# Patient Record
Sex: FEMALE | Race: WHITE | NOT HISPANIC OR LATINO | Employment: FULL TIME | URBAN - METROPOLITAN AREA
[De-identification: names, ages, dates, MRNs, and addresses within clinical notes are randomized per-mention and may not be internally consistent; named-entity substitution may affect disease eponyms.]

---

## 2017-04-03 ENCOUNTER — NON-PROVIDER VISIT (OUTPATIENT)
Dept: URGENT CARE | Facility: CLINIC | Age: 27
End: 2017-04-03

## 2017-04-03 DIAGNOSIS — Z04.89 EXAMINATION FOR MEDICOLEGAL REASON: ICD-10-CM

## 2017-04-03 LAB
AMP AMPHETAMINE: NORMAL
COC COCAINE: NORMAL
INT CON NEG: NORMAL
INT CON POS: NORMAL
OPI OPIATES: NORMAL
PCP PHENCYCLIDINE: NORMAL
POC DRUG COMMENT 753798-OCCUPATIONAL HEALTH: NORMAL
THC: NORMAL

## 2017-04-03 PROCEDURE — 80305 DRUG TEST PRSMV DIR OPT OBS: CPT | Performed by: FAMILY MEDICINE

## 2018-01-14 ENCOUNTER — OFFICE VISIT (OUTPATIENT)
Dept: URGENT CARE | Facility: PHYSICIAN GROUP | Age: 28
End: 2018-01-14
Payer: COMMERCIAL

## 2018-01-14 VITALS
HEART RATE: 102 BPM | BODY MASS INDEX: 22.37 KG/M2 | WEIGHT: 147.6 LBS | TEMPERATURE: 98.9 F | SYSTOLIC BLOOD PRESSURE: 100 MMHG | OXYGEN SATURATION: 96 % | HEIGHT: 68 IN | DIASTOLIC BLOOD PRESSURE: 60 MMHG

## 2018-01-14 DIAGNOSIS — J06.9 VIRAL URI WITH COUGH: ICD-10-CM

## 2018-01-14 DIAGNOSIS — Z87.09 HISTORY OF ASTHMA: ICD-10-CM

## 2018-01-14 PROCEDURE — 99214 OFFICE O/P EST MOD 30 MIN: CPT | Performed by: NURSE PRACTITIONER

## 2018-01-14 RX ORDER — PREDNISONE 20 MG/1
TABLET ORAL
Qty: 10 TAB | Refills: 0 | Status: SHIPPED | OUTPATIENT
Start: 2018-01-14 | End: 2021-02-19

## 2018-01-14 RX ORDER — CODEINE PHOSPHATE AND GUAIFENESIN 10; 100 MG/5ML; MG/5ML
5 SOLUTION ORAL EVERY 4 HOURS PRN
Qty: 250 ML | Refills: 0 | Status: SHIPPED | OUTPATIENT
Start: 2018-01-14 | End: 2018-01-21

## 2018-01-14 ASSESSMENT — ENCOUNTER SYMPTOMS
SHORTNESS OF BREATH: 0
SORE THROAT: 0
FEVER: 0
COUGH: 1
WHEEZING: 0

## 2018-01-14 NOTE — PROGRESS NOTES
"Subjective:      Dee Vance is a 27 y.o. female who presents with Cough (chest congestion, x3 days purple lips, x1 month )            Cough   This is a new problem. Episode onset: Pt reports onset of cough 4-5 days ago. Admits she is coughing all night long, difficult to sleep. She has a hx of asthma. The problem has been gradually worsening. The cough is non-productive. Pertinent negatives include no fever, nasal congestion, sore throat, shortness of breath or wheezing. Associated symptoms comments: Pt also reports her lips have been blue in several for the past month. She is unsure what caused this. The area of discoloration has not changed and it does not migrate to other areas of her lips. Denies any daily medication or known allergies. She has tried a beta-agonist inhaler and rest for the symptoms. The treatment provided mild relief. Her past medical history is significant for asthma. There is no history of bronchitis.       Review of Systems   Constitutional: Negative for fever.   HENT: Negative for sore throat.         Purple discoloration to lips   Respiratory: Positive for cough. Negative for shortness of breath and wheezing.    All other systems reviewed and are negative.    Past Medical History:   Diagnosis Date   • ASTHMA       Past Surgical History:   Procedure Laterality Date   • OTHER      tonsillectomy      Social History     Social History   • Marital status: Single     Spouse name: N/A   • Number of children: N/A   • Years of education: N/A     Occupational History   • Not on file.     Social History Main Topics   • Smoking status: Never Smoker   • Smokeless tobacco: Never Used   • Alcohol use No   • Drug use: No   • Sexual activity: Not on file     Other Topics Concern   • Not on file     Social History Narrative   • No narrative on file          Objective:     /60   Pulse (!) 102   Temp 37.2 °C (98.9 °F)   Ht 1.727 m (5' 8\")   Wt 67 kg (147 lb 9.6 oz)   SpO2 96%   " Breastfeeding? No   BMI 22.44 kg/m²      Physical Exam   Constitutional: She is oriented to person, place, and time. Vital signs are normal. She appears well-developed and well-nourished.   HENT:   Head: Normocephalic and atraumatic.   Right Ear: Tympanic membrane and external ear normal.   Left Ear: Tympanic membrane and external ear normal.   Nose: Nose normal.   Mouth/Throat:       Eyes: EOM are normal. Pupils are equal, round, and reactive to light.   Cardiovascular: Normal rate and regular rhythm.    Pulmonary/Chest: Effort normal. She has wheezes in the right upper field and the left upper field. She has rhonchi in the right upper field and the left upper field.   Musculoskeletal: Normal range of motion.   Neurological: She is alert and oriented to person, place, and time.   Skin: Skin is warm and dry. Capillary refill takes less than 2 seconds.   Psychiatric: She has a normal mood and affect. Her speech is normal and behavior is normal. Thought content normal.   Vitals reviewed.              Assessment/Plan:     1. Viral URI with cough  - guaifenesin-codeine (ROBITUSSIN AC) Solution oral solution; Take 5 mL by mouth every four hours as needed for Cough for up to 7 days.  Dispense: 250 mL; Refill: 0    2. History of asthma  - predniSONE (DELTASONE) 20 MG Tab; Take 2 tabs PO daily for five days  Dispense: 10 Tab; Refill: 0  - REFERRAL TO FAMILY PRACTICE    Pt admits she does have a nebulizer at home, she can use this 2-3 times per day as needed for the cough and wheeze  Steroid burst if needed  Sedating effects of cough syrup discussed. Checked patient's  and find no evidence of narcotic misuse.  Advised her she does not require antibiotics at this time  OTC cough syrup during the day  Supportive care, differential diagnoses, and indications for immediate follow-up discussed with patient.    Pathogenesis of diagnosis discussed including typical length and natural progression.      Instructed to return to   or nearest emergency department if symptoms fail to improve, for any change in condition, further concerns, or new concerning symptoms.  Patient states understanding of the plan of care and discharge instructions.

## 2018-04-23 ENCOUNTER — HOSPITAL ENCOUNTER (EMERGENCY)
Facility: MEDICAL CENTER | Age: 28
End: 2018-04-23
Attending: EMERGENCY MEDICINE
Payer: MEDICAID

## 2018-04-23 VITALS
RESPIRATION RATE: 16 BRPM | WEIGHT: 144.4 LBS | TEMPERATURE: 98 F | OXYGEN SATURATION: 96 % | SYSTOLIC BLOOD PRESSURE: 118 MMHG | BODY MASS INDEX: 21.89 KG/M2 | HEIGHT: 68 IN | DIASTOLIC BLOOD PRESSURE: 60 MMHG | HEART RATE: 98 BPM

## 2018-04-23 DIAGNOSIS — L81.9 DISORDER OF PIGMENTATION OF LIP: ICD-10-CM

## 2018-04-23 PROCEDURE — 99283 EMERGENCY DEPT VISIT LOW MDM: CPT

## 2018-04-23 ASSESSMENT — PAIN SCALES - GENERAL: PAINLEVEL_OUTOF10: 6

## 2018-04-23 NOTE — ED TRIAGE NOTES
Patient to ED with complaints of lip discoloration and tingling. States she noticed lips turned purple a few months ago but now discoloration is worse and tingling is constant and uncomfortable. No know food allergies. Has not started any new medications. No facial swelling. No wheezing or tongue swelling. Denies SOB.     Pt educated on ED process and asked to wait in lobby. Patient educated on importance of alerting staff to new or worsening symptoms or concerns.

## 2018-04-24 NOTE — DISCHARGE INSTRUCTIONS
Be sure to follow-up with dermatologist, return to emergency department with any swelling of your lips, any fever or any other complaints

## 2018-04-24 NOTE — ED PROVIDER NOTES
"ED Provider Note    CHIEF COMPLAINT  Chief Complaint   Patient presents with   • Lip Swelling     skin discoloration, tingling  months on and off worse today       HPI  Dee Vance is a 27 y.o. female who presents for evaluation of tingling of her lips, present for the past couple days but intermittently present for about 6 months. She reports over this timeframe she also has intermittent patchy discoloration of her lips, no swelling, no difficulty swallowing, no numbness, no other complaints    REVIEW OF SYSTEMS  Negative for fever, weakness, numbness.    PAST MEDICAL HISTORY   has a past medical history of ASTHMA.    SOCIAL HISTORY  Social History     Social History Main Topics   • Smoking status: Never Smoker   • Smokeless tobacco: Never Used   • Alcohol use No   • Drug use: No   • Sexual activity: Not on file       SURGICAL HISTORY   has a past surgical history that includes other.    CURRENT MEDICATIONS  I personally reviewed the medication list in the charting documentation.     ALLERGIES  No Known Allergies    PHYSICAL EXAM  VITAL SIGNS: /69   Pulse (!) 105   Temp 36.7 °C (98 °F)   Resp 17   Ht 1.727 m (5' 8\")   Wt 65.5 kg (144 lb 6.4 oz)   SpO2 96%   BMI 21.96 kg/m²   Constitutional: Alert in no apparent distress.  HENT: No lymphedema, subtle patchy bluish discoloration of the lips, no vesicles, no other lesions  Eyes: Conjunctiva normal, Non-icteric.   Chest: Normal nonlabored respirations  Skin: No erythema, No rash.   Musculoskeletal: Good range of motion in all major joints.   Neurologic: Alert, No focal deficits noted.   Psychiatric: Affect normal, Judgment normal.    COURSE & MEDICAL DECISION MAKING  Pertinent Labs & Imaging studies reviewed. (See chart for details)    Encounter Summary: This is a 27 y.o. female with discoloration and tingling of her lips, intermittently present for 6 months or so, no wounds, no vesicles, no other abnormalities on exam. At this point I do not think " there is any emergent or urgent etiologies, she'll follow up with dermatologist, return instructions discussed      DISPOSITION: Discharge Home      FINAL IMPRESSION  1. Disorder of pigmentation of lip        This dictation was created using voice recognition software. The accuracy of the dictation is limited to the abilities of the software. I expect there may be some errors of grammar and possibly content. The nursing notes were reviewed and certain aspects of this information were incorporated into this note.    Electronically signed by: Ferny Cain, 4/23/2018 6:40 PM

## 2018-04-24 NOTE — ED NOTES
.Patient d/c per MD order.  Pt states understanding of d.c instructions.  Pt given copies of instructions.  Belongings with pt.  VSS.  Pt ambulate to lobby with steady gait.

## 2019-07-12 ENCOUNTER — TELEPHONE (OUTPATIENT)
Dept: SCHEDULING | Facility: IMAGING CENTER | Age: 29
End: 2019-07-12

## 2020-07-29 ENCOUNTER — HOSPITAL ENCOUNTER (EMERGENCY)
Facility: MEDICAL CENTER | Age: 30
End: 2020-07-29
Attending: EMERGENCY MEDICINE | Admitting: EMERGENCY MEDICINE

## 2020-07-29 VITALS
DIASTOLIC BLOOD PRESSURE: 74 MMHG | OXYGEN SATURATION: 100 % | HEART RATE: 85 BPM | SYSTOLIC BLOOD PRESSURE: 110 MMHG | BODY MASS INDEX: 22.52 KG/M2 | WEIGHT: 148.59 LBS | TEMPERATURE: 97 F | HEIGHT: 68 IN | RESPIRATION RATE: 16 BRPM

## 2020-07-29 DIAGNOSIS — S05.02XA ABRASION OF LEFT CORNEA, INITIAL ENCOUNTER: ICD-10-CM

## 2020-07-29 PROCEDURE — 99283 EMERGENCY DEPT VISIT LOW MDM: CPT

## 2020-07-29 PROCEDURE — 700101 HCHG RX REV CODE 250: Performed by: EMERGENCY MEDICINE

## 2020-07-29 RX ORDER — PROPARACAINE HYDROCHLORIDE 5 MG/ML
1 SOLUTION/ DROPS OPHTHALMIC ONCE
Status: DISCONTINUED | OUTPATIENT
Start: 2020-07-29 | End: 2020-07-29

## 2020-07-29 RX ORDER — PROPARACAINE HYDROCHLORIDE 5 MG/ML
1 SOLUTION/ DROPS OPHTHALMIC ONCE
Status: COMPLETED | OUTPATIENT
Start: 2020-07-29 | End: 2020-07-29

## 2020-07-29 RX ORDER — CIPROFLOXACIN HYDROCHLORIDE 3.5 MG/ML
1 SOLUTION/ DROPS TOPICAL ONCE
Status: COMPLETED | OUTPATIENT
Start: 2020-07-29 | End: 2020-07-29

## 2020-07-29 RX ORDER — CIPROFLOXACIN HYDROCHLORIDE 3.5 MG/ML
1 SOLUTION/ DROPS TOPICAL EVERY 4 HOURS
Qty: 2 ML | Refills: 0 | Status: SHIPPED | OUTPATIENT
Start: 2020-07-29 | End: 2020-08-03

## 2020-07-29 RX ADMIN — PROPARACAINE HYDROCHLORIDE 1 DROP: 5 SOLUTION/ DROPS OPHTHALMIC at 17:00

## 2020-07-29 RX ADMIN — FLUORESCEIN SODIUM 1 MG: 1 STRIP OPHTHALMIC at 17:00

## 2020-07-29 RX ADMIN — CIPROFLOXACIN 1 DROP: 3 SOLUTION OPHTHALMIC at 17:54

## 2020-07-29 ASSESSMENT — ENCOUNTER SYMPTOMS
EYE PAIN: 1
FEVER: 0
EYE REDNESS: 1

## 2020-07-29 NOTE — ED PROVIDER NOTES
ED Provider Note    Scribed for Ratna Larkin M.D. by Bogdan Marks. 7/29/2020, 4:56 PM.    Primary care provider: Pcp Pt States None  Means of arrival: Walk in  History obtained from: Patient  History limited by: None    CHIEF COMPLAINT  Chief Complaint   Patient presents with   • Eye Pain     pt woke with left eye pain, redness, and foreign body feeling. pt als noted to have some redness in the periorbital area.        HPI  Dee Vance is a 29 y.o. female who presents to the Emergency Department complaining of left eye pain onset this morning. Patient states she woke up this morning and put on her contacts. States her eye then started watering, causing the contact to fall out. She removed the contact but states her left eye has been painful, itchy, and red since then. She thought that she sustained an eyelash in the eye, so she put on some eyedrops to attempt to flush out the eye but states it did not help. She continues to have the eye pain and redness.  Pain is not worsened with extraocular movements.  Patient does not believe her workplace is a source of a potential foreign body, as she works in an office setting. She does wear prescription corrective glasses when she is not wearing her contacts.  Patient reports that she occasionally does leave her contacts on at night and forgets to take them out.  No reports of any recent fevers.    PPE Note: I personally donned PPE for all patient encounters during this visit, including with a surgical mask.     Scribe remained outside the patient's room and did not have any contact with the patient for the duration of patient encounter.       REVIEW OF SYSTEMS  Review of Systems   Constitutional: Negative for fever.   Eyes: Positive for pain (left) and redness (left).        Positive left eye watering and itchiness     PAST MEDICAL HISTORY   has a past medical history of ASTHMA.    SURGICAL HISTORY   has a past surgical history that includes  "other.    SOCIAL HISTORY  Social History     Tobacco Use   • Smoking status: Never Smoker   • Smokeless tobacco: Never Used   Substance Use Topics   • Alcohol use: No   • Drug use: No      Social History     Substance and Sexual Activity   Drug Use No       FAMILY HISTORY  No pertinent family history reported.     CURRENT MEDICATIONS  Home Medications     Reviewed by Santa Ash R.N. (Registered Nurse) on 07/29/20 at 1526  Med List Status: Complete   Medication Last Dose Status   albuterol (VENTOLIN OR PROVENTIL) 108 (90 BASE) MCG/ACT AERS inhalation aerosol  Active   albuterol (VENTOLIN OR PROVENTIL) 108 (90 BASE) MCG/ACT AERS inhalation aerosol  Active   azithromycin (ZITHROMAX) 250 MG TABS Not Taking Active   hydrocodone-acetaminophen (NORCO) 5-325 MG TABS per tablet Not Taking Active   predniSONE (DELTASONE) 20 MG Tab Not Taking Active                ALLERGIES  No Known Allergies    PHYSICAL EXAM  VITAL SIGNS: BP (!) 98/60   Pulse (!) 108   Temp 36.1 °C (97 °F) (Temporal)   Resp 16   Ht 1.727 m (5' 8\")   Wt 67.4 kg (148 lb 9.4 oz)   LMP 06/30/2020   SpO2 98%   BMI 22.59 kg/m²   Vitals reviewed by myself.  Physical Exam  Nursing note and vitals reviewed.  Constitutional: Well-developed and well-nourished. No acute distress.   HENT: Head is normocephalic and atraumatic.  Eyes: extra-ocular movements intact without pain, pupils are equal and reactive to light bilaterally.  No periorbital edema or erythema.. Eye pressures 10 bilaterally.  Visual acuity is 20/70 in the left eye, 20/50 in the right eye, 20/50 in both eyes without glasses as she forgot to bring them.  Corneal abrasion to left eye , no retained foreign body, no corneal ulcer, negative Racquel sign  Cardiovascular: Tachycardic and regular rhythm. No murmur heard.  Pulmonary/Chest: Effort normal  Musculoskeletal: Extremities exhibit normal range of motion without edema or tenderness.   Neurological: Awake and alert  Skin: Skin is warm and dry. " No rash.        REASSESSMENT  4:56 PM Patient seen and examined at bedside. Explained to patient that her physical exam shows corneal abrasions. There was no retained foreign body or corneal ulcer. The patient will be discharged with instructions regarding supportive care and medications. Prescription for antibiotic eyedrop was provided. Instructions were given for follow-up with her eye doctor. Discussed indications for seeking immediate medical attention. Patient was given the opportunity for questions. The patient understands and agrees. I advised patient to avoid wearing her contacts until her symptoms have fully resolved.     COURSE & MEDICAL DECISION MAKING  Nursing notes, VS, PMSFHx reviewed in chart.    Patient is a 29-year-old female who comes in for evaluation of left eye pain.  Differential diagnosis includes corneal abrasion, corneal ulcer, dry eyes.  On physical exam patient is well-appearing with vitals notable for slight tachycardia likely secondary to her discomfort.  Tachycardia resolved with treatment of eye pain with proparacaine.  Patient's eye pressures are within normal limits making acute glaucoma unlikely.  Exam is consistent with corneal abrasion likely from when she was attempting to put her contact in her eye.  She has no evidence of retained foreign body.  I advised patient that we will start her on ciprofloxacin ophthalmologic drops and have advised her to not wear her contacts for a couple of days until this is healed.  She does have corrective glasses that she can wear in the meantime.  I also advised her to follow-up with ophthalmology and gave her strict return precautions.  Patient is then discharged in stable condition.      FINAL IMPRESSION  1. Abrasion of left cornea, initial encounter          Bogdan ZABALA (Erik), vaibhav scribing for, and in the presence of, Ratna Larkin M.D..    Electronically signed by: Bogdan Marks (Erik), 7/29/2020    Ratna ZABALA,  M.D. personally performed the services described in this documentation, as scribed by Bogdan Marks in my presence, and it is both accurate and complete. E    The note accurately reflects work and decisions made by me.  Ratna Larkin M.D.  7/29/2020  7:49 PM

## 2020-07-29 NOTE — ED TRIAGE NOTES
"Dee Vance   29 y.o. female   Chief Complaint   Patient presents with   • Eye Pain     pt woke with left eye pain, redness, and foreign body feeling. pt als noted to have some redness in the periorbital area.       Pt amb to triage with steady gait for above complaint.  Pt is alert and oriented, speaking in full sentences, follows commands and responds appropriately to questions. Pt appears to be in some discomfort. Resp are even and unlabored.   Pt placed in lobby. Pt educated on triage process. Pt encouraged to alert staff for any changes.    BP (!) 98/60   Pulse (!) 108   Temp 36.1 °C (97 °F) (Temporal)   Resp 16   Ht 1.727 m (5' 8\")   Wt 67.4 kg (148 lb 9.4 oz)   LMP 06/30/2020   SpO2 98%   BMI 22.59 kg/m²     "

## 2020-07-30 NOTE — ED NOTES
Patient given discharge instructions, follow up information, and prescription x 1, verbalized understanding, requesting work note, ERP notified.

## 2021-01-11 ENCOUNTER — HOSPITAL ENCOUNTER (EMERGENCY)
Facility: MEDICAL CENTER | Age: 31
End: 2021-01-11
Attending: EMERGENCY MEDICINE
Payer: OTHER MISCELLANEOUS

## 2021-01-11 VITALS
BODY MASS INDEX: 23.32 KG/M2 | WEIGHT: 153.88 LBS | OXYGEN SATURATION: 99 % | HEART RATE: 89 BPM | TEMPERATURE: 97.9 F | RESPIRATION RATE: 15 BRPM | SYSTOLIC BLOOD PRESSURE: 122 MMHG | DIASTOLIC BLOOD PRESSURE: 82 MMHG | HEIGHT: 68 IN

## 2021-01-11 DIAGNOSIS — S43.402A SPRAIN OF LEFT SHOULDER, UNSPECIFIED SHOULDER SPRAIN TYPE, INITIAL ENCOUNTER: ICD-10-CM

## 2021-01-11 DIAGNOSIS — S16.1XXA STRAIN OF NECK MUSCLE, INITIAL ENCOUNTER: ICD-10-CM
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
## 2021-01-11 PROCEDURE — 99284 EMERGENCY DEPT VISIT MOD MDM: CPT

## 2021-01-11 PROCEDURE — 700102 HCHG RX REV CODE 250 W/ 637 OVERRIDE(OP): Performed by: EMERGENCY MEDICINE

## 2021-01-11 PROCEDURE — A9270 NON-COVERED ITEM OR SERVICE: HCPCS | Performed by: EMERGENCY MEDICINE

## 2021-01-11 RX ORDER — IBUPROFEN 600 MG/1
600 TABLET ORAL EVERY 6 HOURS PRN
Qty: 20 TAB | Refills: 0 | Status: SHIPPED | OUTPATIENT
Start: 2021-01-11 | End: 2021-02-19

## 2021-01-11 RX ORDER — METHOCARBAMOL 500 MG/1
500 TABLET, FILM COATED ORAL EVERY 6 HOURS PRN
Qty: 20 TAB | Refills: 0 | Status: SHIPPED | OUTPATIENT
Start: 2021-01-11 | End: 2021-02-19

## 2021-01-11 RX ORDER — IBUPROFEN 600 MG/1
600 TABLET ORAL ONCE
Status: COMPLETED | OUTPATIENT
Start: 2021-01-11 | End: 2021-01-11

## 2021-01-11 RX ADMIN — IBUPROFEN 600 MG: 600 TABLET ORAL at 13:43

## 2021-01-11 ASSESSMENT — LIFESTYLE VARIABLES
DO YOU DRINK ALCOHOL: NO
HAVE YOU EVER FELT YOU SHOULD CUT DOWN ON YOUR DRINKING: NO
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
## 2021-01-11 NOTE — DISCHARGE INSTRUCTIONS
Follow-up with primary care 1 to 2 days for reevaluation, to establish care, for medication management, close blood pressure monitoring and referral to orthopedics if symptoms persist.    Motrin every 6 hours as needed for discomfort.  Robaxin every 6 hours as needed for pain or spasm.    Slow stretching and range of motion exercises encouraged.  Weightbearing and activity as tolerated.    Return to the emergency department for persistent or worsening neck pain, shoulder pain, extremity weakness or paresthesias, swelling, discoloration or other new concerns.

## 2021-02-15 ENCOUNTER — TELEPHONE (OUTPATIENT)
Dept: SCHEDULING | Facility: IMAGING CENTER | Age: 31
End: 2021-02-15

## 2021-02-17 ENCOUNTER — APPOINTMENT (OUTPATIENT)
Dept: MEDICAL GROUP | Facility: PHYSICIAN GROUP | Age: 31
End: 2021-02-17

## 2021-02-19 ENCOUNTER — OFFICE VISIT (OUTPATIENT)
Dept: MEDICAL GROUP | Facility: PHYSICIAN GROUP | Age: 31
End: 2021-02-19
Payer: COMMERCIAL

## 2021-02-19 VITALS
SYSTOLIC BLOOD PRESSURE: 110 MMHG | DIASTOLIC BLOOD PRESSURE: 60 MMHG | HEIGHT: 68 IN | RESPIRATION RATE: 16 BRPM | OXYGEN SATURATION: 96 % | WEIGHT: 165.8 LBS | HEART RATE: 95 BPM | TEMPERATURE: 97.2 F | BODY MASS INDEX: 25.13 KG/M2

## 2021-02-19 DIAGNOSIS — F15.20 METHAMPHETAMINE ADDICTION (HCC): ICD-10-CM

## 2021-02-19 DIAGNOSIS — G47.09 OTHER INSOMNIA: ICD-10-CM

## 2021-02-19 DIAGNOSIS — F41.9 ANXIETY: ICD-10-CM

## 2021-02-19 PROCEDURE — 99204 OFFICE O/P NEW MOD 45 MIN: CPT | Performed by: PHYSICIAN ASSISTANT

## 2021-02-19 RX ORDER — TRAZODONE HYDROCHLORIDE 50 MG/1
50 TABLET ORAL EVERY EVENING
Qty: 90 TABLET | Refills: 1 | Status: SHIPPED | OUTPATIENT
Start: 2021-02-19 | End: 2021-03-12

## 2021-02-19 ASSESSMENT — ANXIETY QUESTIONNAIRES
2. NOT BEING ABLE TO STOP OR CONTROL WORRYING: NOT AT ALL
1. FEELING NERVOUS, ANXIOUS, OR ON EDGE: SEVERAL DAYS
5. BEING SO RESTLESS THAT IT IS HARD TO SIT STILL: SEVERAL DAYS
GAD7 TOTAL SCORE: 2
4. TROUBLE RELAXING: NOT AT ALL
3. WORRYING TOO MUCH ABOUT DIFFERENT THINGS: NOT AT ALL
6. BECOMING EASILY ANNOYED OR IRRITABLE: NOT AT ALL
7. FEELING AFRAID AS IF SOMETHING AWFUL MIGHT HAPPEN: NOT AT ALL

## 2021-02-19 ASSESSMENT — PATIENT HEALTH QUESTIONNAIRE - PHQ9: CLINICAL INTERPRETATION OF PHQ2 SCORE: 0

## 2021-02-23 PROBLEM — F15.20 METHAMPHETAMINE ADDICTION (HCC): Status: ACTIVE | Noted: 2021-02-23

## 2021-02-23 PROBLEM — G47.09 OTHER INSOMNIA: Status: ACTIVE | Noted: 2021-02-23

## 2021-02-23 PROBLEM — F41.9 ANXIETY: Status: ACTIVE | Noted: 2021-02-23

## 2021-02-23 NOTE — PROGRESS NOTES
Chief Complaint   Patient presents with   • Establish Care   • Insomnia     pt states cannot sleep, pt states has recently gotten clean and now cannot sleep and her moods fluctuate a lot.    • Medication Refill     albuterol inhaler       HISTORY OF THE PRESENT ILLNESS: Dee Vance is a 30 y.o. female new patient to our practice. This pleasant patient is here today to establish care and to discuss the evaluation and management of:    Patient is a pleasant 30-year-old female here today to establish care.  She would like to discuss anxiety and insomnia.  Patient tells me that she stopped using methamphetamine 8 days ago.  States she has been abusing methamphetamine since 14 years old on and off.  He tells me she smoked methamphetamine.  States in 2019 she was sober but started using frequently again in October 2020.  Patient states she is on probation and if she misses 1 more time she will be going to nursing home.  Patient states she is scared today of going to nursing home and does not want to mess up.  Patient states she may have to [address]  She is agreed to follow-up with Dr. Monahan in addiction medicine.  Referral will be placed.  Patient states she has a good support system.  She and her partner are working on the relationship.  She has been using over-the-counter melatonin with minimal alleviation sleep deprivation symptoms.  States she recalls 1 night taking up to 60 mg for insomnia symptoms.  Cannot sleep she will go for walks sometimes will call her mom.  Patient denies homicidal or suicidal ideation.        Past Medical History:   Diagnosis Date   • ASTHMA    • Pre-eclampsia        Past Surgical History:   Procedure Laterality Date   • OTHER      tonsillectomy       Family Status   Relation Name Status   • Mo  Alive   • Fa  Alive   History reviewed. No pertinent family history.    Social History     Tobacco Use   • Smoking status: Never Smoker   • Smokeless tobacco: Never Used   Substance Use Topics   •  "Alcohol use: No   • Drug use: Yes     Types: Methamphetamines       Allergies: Patient has no known allergies.    Current Outpatient Medications Ordered in Epic   Medication Sig Dispense Refill   • traZODone (DESYREL) 50 MG Tab Take 1 tablet by mouth every evening. 90 tablet 1   • albuterol (VENTOLIN OR PROVENTIL) 108 (90 BASE) MCG/ACT AERS inhalation aerosol Inhale 2 Puffs by mouth every 6 hours as needed.     • albuterol (VENTOLIN OR PROVENTIL) 108 (90 BASE) MCG/ACT AERS inhalation aerosol Inhale 2 Puffs by mouth every 6 hours as needed for Shortness of Breath. 8.5 g 1     No current Psychiatric-ordered facility-administered medications on file.       Review of Systems   Constitutional: Negative for fever, chills, weight loss and malaise/fatigue.   HENT: Negative for ear pain, nosebleeds, congestion, sore throat and neck pain.    Eyes: Negative for blurred vision.   Respiratory: Negative for cough, sputum production, shortness of breath and wheezing.    Cardiovascular: Negative for chest pain, palpitations, orthopnea and leg swelling.   Gastrointestinal: Negative for heartburn, nausea, vomiting and abdominal pain.   Genitourinary: Negative for dysuria, urgency and frequency.   Musculoskeletal: Negative for myalgias, back pain and joint pain.   Skin: Negative for rash and itching.   Neurological: Negative for dizziness, tingling, tremors, sensory change, focal weakness and headaches.   Endo/Heme/Allergies: Does not bruise/bleed easily.   Psychiatric/Behavioral: Negative for depression or memory loss.  Positive for anxiety and insomnia.  All other systems reviewed and are negative except as in HPI.    Exam: /60 (BP Location: Left arm, Patient Position: Sitting, BP Cuff Size: Adult)   Pulse 95   Temp 36.2 °C (97.2 °F) (Temporal)   Resp 16   Ht 1.727 m (5' 8\")   Wt 75.2 kg (165 lb 12.8 oz)   SpO2 96%  Body mass index is 25.21 kg/m².  General: Normal appearing. No distress.  HEENT: Normocephalic. Eyes conjunctiva " clear lids without ptosis, ears normal shape and contour.  Neck: Supple without JVD. Thyroid is not enlarged.  Pulmonary: Clear to ausculation.  Normal effort. No rales, ronchi, or wheezing.  Cardiovascular: Regular rate and rhythm without murmur.   Abdomen: Nondistended.   Neurologic: Grossly nonfocal  Skin: Warm and dry.  No obvious lesions.  Musculoskeletal: Normal gait. No extremity cyanosis, clubbing, or edema.  Psych: Normal mood and affect. Alert and oriented x3. Judgment and insight is normal.      Medical decision-making and discussion:  1. Methamphetamine addiction (HCC)  2. Other insomnia  3. Anxiety  Patient has been referred to Dr. Monahan in addiction medicine.  She has been prescribed trazodone 50 mg tab once nightly.  Advised patient to start by taking half a tablet by mouth once nightly 3-60 minutes prior to bedtime and if sleep deprivation symptoms do not improve with 25 mg increase up to 50.  Advised patient she can increase it to 100 mg once nightly.  Continue work on diet, exercise, sleep hygiene, hydration, and developing healthy coping mechanisms for stress anxiety.  Discussed ED precautions with patient.  Discussed importance of maintaining sobriety.    Denies any suicidal or homicidal ideation. Discussed that should the patient have any symptoms they should call suicide prevention hotline or report to the emergency room immediately.    Patient will follow-up in 1 month for reevaluation.      - REFERRAL TO BEHAVIORAL HEALTH  - traZODone (DESYREL) 50 MG Tab; Take 1 tablet by mouth every evening.  Dispense: 90 tablet; Refill: 1    Please note that this dictation was created using voice recognition software. I have made every reasonable attempt to correct obvious errors, but I expect that there are errors of grammar and possibly content that I did not discover before finalizing the note.      Assessment/Plan  1. Methamphetamine addiction (HCC)  REFERRAL TO BEHAVIORAL HEALTH   2. Other insomnia   REFERRAL TO BEHAVIORAL HEALTH    traZODone (DESYREL) 50 MG Tab   3. Anxiety  REFERRAL TO BEHAVIORAL HEALTH       Return in about 1 month (around 3/19/2021).

## 2021-03-04 DIAGNOSIS — F15.20 METHAMPHETAMINE ADDICTION (HCC): ICD-10-CM

## 2021-03-12 ENCOUNTER — OFFICE VISIT (OUTPATIENT)
Dept: MEDICAL GROUP | Facility: PHYSICIAN GROUP | Age: 31
End: 2021-03-12
Payer: COMMERCIAL

## 2021-03-12 VITALS
HEART RATE: 96 BPM | RESPIRATION RATE: 16 BRPM | DIASTOLIC BLOOD PRESSURE: 60 MMHG | OXYGEN SATURATION: 98 % | HEIGHT: 68 IN | WEIGHT: 172 LBS | SYSTOLIC BLOOD PRESSURE: 100 MMHG | BODY MASS INDEX: 26.07 KG/M2 | TEMPERATURE: 97.6 F

## 2021-03-12 DIAGNOSIS — N64.3 GALACTORRHEA: ICD-10-CM

## 2021-03-12 DIAGNOSIS — R53.82 CHRONIC FATIGUE: ICD-10-CM

## 2021-03-12 DIAGNOSIS — G47.09 OTHER INSOMNIA: ICD-10-CM

## 2021-03-12 DIAGNOSIS — F41.9 ANXIETY: ICD-10-CM

## 2021-03-12 DIAGNOSIS — Z00.00 WELLNESS EXAMINATION: ICD-10-CM

## 2021-03-12 PROCEDURE — 99214 OFFICE O/P EST MOD 30 MIN: CPT | Performed by: PHYSICIAN ASSISTANT

## 2021-03-12 RX ORDER — AMITRIPTYLINE HYDROCHLORIDE 25 MG/1
25 TABLET, FILM COATED ORAL NIGHTLY PRN
Qty: 90 TABLET | Refills: 1 | Status: SHIPPED | OUTPATIENT
Start: 2021-03-12 | End: 2021-03-16 | Stop reason: SDUPTHER

## 2021-03-12 NOTE — PROGRESS NOTES
Chief Complaint   Patient presents with   • Follow-Up     insomnia,pt states little improvement       HISTORY OF PRESENT ILLNESS: Dee Vance is an established 30 y.o. female here to discuss the evaluation and management of:    Patient is a pleasant 30-year-old female here today to follow-up on insomnia.  Patient has a positive past medical history for methamphetamine addiction.  She has been maintaining sobriety for several weeks.  During her last appointment patient was prescribed trazodone 50 mg tab once nightly and given instructions on how to titrate medication up if warranted.  Patient states she has been taking 125 mg once nightly.  On average sleeps 6-7 hours per night.  She has noticed some unpleasant side effects since starting medication.  She has been experiencing daily headaches in the a.m. when waking will have to take over-the-counter ibuprofen for improvement of headache symptoms.  She is also been experiencing galactorrhea and bilateral breast swelling.  Galactorrhea is a white egg color and not discussed.  States she does not experience galactorrhea all the time but does notice it in the shower with the warm water comes into contact with her breasts.  Patient would like to discuss other treatment options for sleep deprivation symptoms.      Patient Active Problem List    Diagnosis Date Noted   • Methamphetamine addiction (HCC) 02/23/2021   • Other insomnia 02/23/2021   • Anxiety 02/23/2021       Allergies:Patient has no known allergies.    Current Outpatient Medications   Medication Sig Dispense Refill   • albuterol (VENTOLIN OR PROVENTIL) 108 (90 BASE) MCG/ACT AERS inhalation aerosol Inhale 2 Puffs by mouth every 6 hours as needed.     • vitamin D, Ergocalciferol, (DRISDOL) 1.25 MG (98293 UT) Cap capsule Take 1 capsule by mouth every 7 days. 30 capsule 0   • amitriptyline (ELAVIL) 25 MG Tab Take 1 tablet by mouth at bedtime as needed for Sleep. 90 tablet 1   • albuterol (VENTOLIN OR  "PROVENTIL) 108 (90 BASE) MCG/ACT AERS inhalation aerosol Inhale 2 Puffs by mouth every 6 hours as needed for Shortness of Breath. 8.5 g 1     No current facility-administered medications for this visit.       Social History     Tobacco Use   • Smoking status: Never Smoker   • Smokeless tobacco: Never Used   Substance Use Topics   • Alcohol use: No   • Drug use: Yes     Types: Methamphetamines       Family Status   Relation Name Status   • Mo  Alive   • Fa  Alive   History reviewed. No pertinent family history.    ROS:  Review of Systems   Constitutional: Negative for fever, chills, weight loss. + for fatigue.   HENT: Negative for ear pain, nosebleeds, congestion, sore throat and neck pain.    Eyes: Negative for blurred vision.   Respiratory: Negative for cough, sputum production, shortness of breath and wheezing.    Cardiovascular: Negative for chest pain, palpitations, orthopnea and leg swelling.   Gastrointestinal: Negative for heartburn, nausea, vomiting and abdominal pain.   Genitourinary: Negative for dysuria, urgency and frequency.   Musculoskeletal: Negative for myalgias, back pain and joint pain.   Skin: Negative for rash and itching.   Neurological: Negative for dizziness, tingling, tremors, sensory change, focal weakness and headaches.   Endo/Heme/Allergies: Does not bruise/bleed easily.   Psychiatric/Behavioral: Negative for depression, suicidal ideas and memory loss.  The patient is nervous/anxious and does have insomnia.    Breast:  Bilaterally symmetrical and no skin changes or dimpling are  noted.  Both breasts are free of palpable pathology and the axillas are free of lymphadenopathy. + for nipple discharge.  All other systems reviewed and are negative except as in HPI.    Exam: /60 (BP Location: Left arm, Patient Position: Sitting, BP Cuff Size: Adult)   Pulse 96   Temp 36.4 °C (97.6 °F) (Temporal)   Resp 16   Ht 1.727 m (5' 8\")   Wt 78 kg (172 lb)   SpO2 98%  Body mass index is 26.15 " kg/m².  General: Normal appearing. No distress.  HEENT: Normocephalic. Eyes conjunctiva clear lids without ptosis, ears normal shape and contour.   Neck: Supple without JVD. Thyroid is not enlarged.  Pulmonary: Clear to ausculation.  Normal effort. No rales, ronchi, or wheezing.  Cardiovascular: Regular rate and rhythm without murmur.   Abdomen: nondistended.   Neurologic: Grossly nonfocal.  Cranial nerves are normal.   Lymph: No cervical, supraclavicular or axillary lymph nodes are palpable  Skin: Warm and dry.  No rashes or suspicious skin lesions.  Musculoskeletal: Normal gait. No extremity cyanosis, clubbing, or edema.  Psych: Normal mood and affect. Alert and oriented x3. Judgment and insight is normal.  Breast:  Bilaterally symmetrical and no skin changes. - for nipple discharge.    Medical decision-making and discussion:  1. Anxiety  2. Other insomnia  Patient has been prescribed amitriptyline 25 mg tab once nightly and given instructions on how to titrate up to full dose if needed.  Discussed common side effects and adverse reactions of medication with patient.  Suspect that galactorrhea secondary to trazodone.  Advised patient to discontinue trazodone.  Continue working on diet, exercise, sleep hygiene, and developing healthy coping mechanisms for stress anxiety.  Patient has been having difficulty getting in with a psychiatrist in psychologist.    Patient will contact me via [employer] with information regarding on who she would like to be referred to and who accepts her medical insurance.  Referral will be placed when I am contacted by patient.  Continue working on sobriety.     Denies any suicidal or homicidal ideation. Discussed that should the patient have any symptoms they should call suicide prevention hotline or report to the emergency room immediately.      - Comp Metabolic Panel; Future  - TSH WITH REFLEX TO FT4; Future      3. Chronic fatigue  Chronic problem.  Unstable.  Continue working on diet,  exercise, sleep hygiene, hydration, developing healthy coping mechanisms for stress anxiety, and maintaining sobriety.  Lab work has been ordered to further evaluate patient.  Patient be contacted with results.  We will discuss lab work results in more detail during follow-up appointment.    - Comp Metabolic Panel; Future  - CBC WITH DIFFERENTIAL; Future  - TSH WITH REFLEX TO FT4; Future  - VITAMIN B12; Future  - VITAMIN D,25 HYDROXY; Future    4. Galactorrhea  Same as #1.  Patient will follow up in 3 weeks for evaluation.  Continue to monitor.    - Comp Metabolic Panel; Future  - CBC WITH DIFFERENTIAL; Future  - TSH WITH REFLEX TO FT4; Future    5. Wellness examination    - Comp Metabolic Panel; Future  - CBC WITH DIFFERENTIAL; Future  - Lipid Profile; Future  - TSH WITH REFLEX TO FT4; Future  - VITAMIN B12; Future  - VITAMIN D,25 HYDROXY; Future      Please note that this dictation was created using voice recognition software. I have made every reasonable attempt to correct obvious errors, but I expect that there are errors of grammar and possibly content that I did not discover before finalizing the note.    Assessment/Plan:  1. Anxiety  Comp Metabolic Panel    TSH WITH REFLEX TO FT4   2. Other insomnia  DISCONTINUED: amitriptyline (ELAVIL) 25 MG Tab   3. Chronic fatigue  Comp Metabolic Panel    CBC WITH DIFFERENTIAL    TSH WITH REFLEX TO FT4    VITAMIN B12    VITAMIN D,25 HYDROXY   4. Galactorrhea  Comp Metabolic Panel    CBC WITH DIFFERENTIAL    TSH WITH REFLEX TO FT4   5. Wellness examination  Comp Metabolic Panel    CBC WITH DIFFERENTIAL    Lipid Profile    TSH WITH REFLEX TO FT4    VITAMIN B12    VITAMIN D,25 HYDROXY       Return in about 3 weeks (around 4/2/2021).

## 2021-03-15 ENCOUNTER — TELEPHONE (OUTPATIENT)
Dept: MEDICAL GROUP | Facility: PHYSICIAN GROUP | Age: 31
End: 2021-03-15

## 2021-03-15 NOTE — TELEPHONE ENCOUNTER
VOICEMAIL  1. Caller Name: SIDNEY Cavazos                      Call Back Number: [phone]    2. Message: [facility] stating a verbal ok to prescribe patient amitriptyline 25 mg     3. Patient approves office to leave a detailed voicemail/MyChart message: yes

## 2021-03-16 ENCOUNTER — TELEPHONE (OUTPATIENT)
Dept: MEDICAL GROUP | Facility: PHYSICIAN GROUP | Age: 31
End: 2021-03-16

## 2021-03-16 DIAGNOSIS — G47.09 OTHER INSOMNIA: ICD-10-CM

## 2021-03-16 DIAGNOSIS — F15.20 METHAMPHETAMINE ADDICTION (HCC): ICD-10-CM

## 2021-03-16 DIAGNOSIS — F41.9 ANXIETY: ICD-10-CM

## 2021-03-16 RX ORDER — AMITRIPTYLINE HYDROCHLORIDE 25 MG/1
25 TABLET, FILM COATED ORAL NIGHTLY PRN
Qty: 90 TABLET | Refills: 1 | Status: SHIPPED | OUTPATIENT
Start: 2021-03-16 | End: 2021-04-02

## 2021-03-16 NOTE — TELEPHONE ENCOUNTER
VOICEMAIL  1. Caller Name: Karen duarte/SIDNEY                      Call Back Number: [phone]    2. Message: Called to verify order sent over for sig received.  Asked for a cb.

## 2021-03-16 NOTE — TELEPHONE ENCOUNTER
Phone Number Called: [phone]    Call outcome: Spoke with Pharmacy Staff    Message: confirmed the sig was correct.

## 2021-03-19 DIAGNOSIS — E55.9 VITAMIN D DEFICIENCY: ICD-10-CM

## 2021-03-19 RX ORDER — ERGOCALCIFEROL 1.25 MG/1
50000 CAPSULE ORAL
Qty: 30 CAPSULE | Refills: 0 | Status: SHIPPED | OUTPATIENT
Start: 2021-03-19

## 2021-04-02 ENCOUNTER — OFFICE VISIT (OUTPATIENT)
Dept: MEDICAL GROUP | Facility: PHYSICIAN GROUP | Age: 31
End: 2021-04-02
Payer: COMMERCIAL

## 2021-04-02 VITALS
TEMPERATURE: 98 F | HEIGHT: 68 IN | SYSTOLIC BLOOD PRESSURE: 108 MMHG | RESPIRATION RATE: 18 BRPM | OXYGEN SATURATION: 98 % | DIASTOLIC BLOOD PRESSURE: 60 MMHG | WEIGHT: 172 LBS | BODY MASS INDEX: 26.07 KG/M2 | HEART RATE: 91 BPM

## 2021-04-02 DIAGNOSIS — N92.0 MENORRHAGIA WITH REGULAR CYCLE: ICD-10-CM

## 2021-04-02 DIAGNOSIS — G47.09 OTHER INSOMNIA: ICD-10-CM

## 2021-04-02 DIAGNOSIS — Z30.09 BIRTH CONTROL COUNSELING: ICD-10-CM

## 2021-04-02 PROCEDURE — 99214 OFFICE O/P EST MOD 30 MIN: CPT | Performed by: PHYSICIAN ASSISTANT

## 2021-04-05 ENCOUNTER — OFFICE VISIT (OUTPATIENT)
Dept: MEDICAL GROUP | Facility: PHYSICIAN GROUP | Age: 31
End: 2021-04-05
Payer: COMMERCIAL

## 2021-04-05 ENCOUNTER — TELEPHONE (OUTPATIENT)
Dept: MEDICAL GROUP | Facility: PHYSICIAN GROUP | Age: 31
End: 2021-04-05

## 2021-04-05 VITALS
HEIGHT: 68 IN | DIASTOLIC BLOOD PRESSURE: 74 MMHG | TEMPERATURE: 98.1 F | BODY MASS INDEX: 26.1 KG/M2 | HEART RATE: 96 BPM | WEIGHT: 172.2 LBS | SYSTOLIC BLOOD PRESSURE: 104 MMHG | OXYGEN SATURATION: 99 % | RESPIRATION RATE: 16 BRPM

## 2021-04-05 DIAGNOSIS — Z30.09 ENCOUNTER FOR COUNSELING REGARDING CONTRACEPTION: ICD-10-CM

## 2021-04-05 PROBLEM — N92.0 MENORRHAGIA WITH REGULAR CYCLE: Status: ACTIVE | Noted: 2021-04-05

## 2021-04-05 PROCEDURE — 99213 OFFICE O/P EST LOW 20 MIN: CPT | Performed by: FAMILY MEDICINE

## 2021-04-05 NOTE — ASSESSMENT & PLAN NOTE
She is here today as she would like to discuss IUD.  She used OCPs several years ago (Ortho-Cyclen) but had significant weight gain so stopped medication.  Currently only using condoms for birth control but states she very definitely does not want to get pregnant.  Therefore she is considering IUD and she like to avoid any methods that lead to weight gain.

## 2021-04-05 NOTE — PROGRESS NOTES
"Subjective:     CC: IUD counseling    HPI:   Dee presents today with     Encounter for counseling regarding contraception  She is here today as she would like to discuss IUD.  She used OCPs several years ago (Ortho-Cyclen) but had significant weight gain so stopped medication.  Currently only using condoms for birth control but states she very definitely does not want to get pregnant.  Therefore she is considering IUD and she like to avoid any methods that lead to weight gain.      Current Outpatient Medications Ordered in Epic   Medication Sig Dispense Refill   • Suvorexant 5 MG Tab Take 1 tablet by mouth at bedtime for 30 days. 30 tablet 0   • vitamin D, Ergocalciferol, (DRISDOL) 1.25 MG (32001 UT) Cap capsule Take 1 capsule by mouth every 7 days. 30 capsule 0   • albuterol (VENTOLIN OR PROVENTIL) 108 (90 BASE) MCG/ACT AERS inhalation aerosol Inhale 2 Puffs by mouth every 6 hours as needed.     • albuterol (VENTOLIN OR PROVENTIL) 108 (90 BASE) MCG/ACT AERS inhalation aerosol Inhale 2 Puffs by mouth every 6 hours as needed for Shortness of Breath. (Patient not taking: Reported on 4/5/2021) 8.5 g 1     No current [facility]-ordered facility-administered medications on file.       Health Maintenance: deferred for PCP    ROS:  Gen: no fevers/chills  Pulm: no sob  CV: no chest pain    Objective:     Exam:  /74 (BP Location: Left arm, Patient Position: Sitting, BP Cuff Size: Adult)   Pulse 96   Temp 36.7 °C (98.1 °F) (Temporal)   Resp 16   Ht 1.727 m (5' 8\")   Wt 78.1 kg (172 lb 3.2 oz)   SpO2 99%   BMI 26.18 kg/m²  Body mass index is 26.18 kg/m².    Gen: Alert and oriented, No apparent distress.  Neck: Neck is supple without lymphadenopathy.  Lungs: Normal effort, CTA bilaterally, no wheezes, rhonchi, or rales  CV: Regular rate and rhythm. No murmurs, rubs, or gallops.  Ext: No clubbing, cyanosis, edema.    Assessment & Plan:     30 y.o. female with the following -     1. Encounter for counseling regarding " contraception  She is here today as she would like to discuss more long-term methods of birth control.  She did use OCPs in the past but had significant weight gain so she would like to use a method that does not cause weight gain.  She reports that she definitely does not want to be pregnant so she is interested in long-term contraception.  She is also looking for methods that might lead to amenorrhea so ultimately were looking at hormonal IUDs.  She has been pregnant in the past but it was delivered via  so we will pick a smaller sized device to help facilitate placement.  We did discuss the procedure and follow-up with potential side effects and she would like to have the device ordered today.  -Ag ordered    I spent a total of 15 minutes with record review, exam, communication with the patient, communication with other providers, and documentation of this encounter.      Return if symptoms worsen or fail to improve.    Please note that this dictation was created using voice recognition software. I have made every reasonable attempt to correct obvious errors, but I expect that there are errors of grammar and possibly content that I did not discover before finalizing the note.

## 2021-04-05 NOTE — PROGRESS NOTES
Chief Complaint   Patient presents with   • Follow-Up     pt not taking medications due to side effects   • Contraception     discuss IUD       HISTORY OF PRESENT ILLNESS: Dee Vance is an established 30 y.o. female here to discuss the evaluation and management of:    Patient is a pleasant 30-year-old female here today to follow-up on insomnia and discuss IUD for heavy menstrual periods.  Patient was recently prescribed amitriptyline for insomnia symptoms.  She tells me that she took medication once but started experiencing dark spots in her vision and became very anxious.  Patient states she did not feel comfortable taking the medication again.  Prior to trying amitriptyline patient was prescribed trazodone but experienced galactorrhea.  Since stopping trazodone galactorrhea has resolved.  She tells me that she is still having difficulties falling and staying asleep.  On average may be sleeping 4 hours per night.  She is ordered a diffuser offline call of cloudy that diffuses melatonin, chamomile and lavender.   Patient is willing to try a different medication for insomnia symptoms.  Discussed Belsomra in great detail with patient.  Patient will be prescribed Belsomra.    Patient states since stopping methamphetamine use on February 9, 2021 her.  Seem to be longer and heavier.  Admits to having a regular menstrual cycle.  Menses is heavy for the first 4 days and she is having to change a super tampon every 2-3 hours.  Periods are now lasting up to 9 days in duration.  States her cramps are severe and over-the-counter ibuprofen does help alleviate cramping symptoms.  Currently on her menses and started on 4/2/2021.  Patient denies any pregnant.  She tells me that she lives an active lifestyle.  She does not smoke.  She denies personal history of DVT/PEs.  Her mother had a pulmonary embolism after a hysterectomy but at that time was a smoker.  Patient would like an IUD.  She tells me she is tried oral  contraceptives in the past and it caused mood swings.      Patient has started outpatient therapy with [facility] and has already seen her therapist twice and will be following up every Monday.  She is starting back to school and recently got a new apartment with 2 bedrooms and 2 bathrooms.  Patient will start keeping her daughter in the near future.      Patient Active Problem List    Diagnosis Date Noted   • Methamphetamine addiction (HCC) 02/23/2021   • Other insomnia 02/23/2021   • Anxiety 02/23/2021       Allergies:Patient has no known allergies.    Current Outpatient Medications   Medication Sig Dispense Refill   • Suvorexant 5 MG Tab Take 1 tablet by mouth at bedtime for 30 days. 30 tablet 0   • vitamin D, Ergocalciferol, (DRISDOL) 1.25 MG (59220 UT) Cap capsule Take 1 capsule by mouth every 7 days. 30 capsule 0   • albuterol (VENTOLIN OR PROVENTIL) 108 (90 BASE) MCG/ACT AERS inhalation aerosol Inhale 2 Puffs by mouth every 6 hours as needed.     • albuterol (VENTOLIN OR PROVENTIL) 108 (90 BASE) MCG/ACT AERS inhalation aerosol Inhale 2 Puffs by mouth every 6 hours as needed for Shortness of Breath. 8.5 g 1     No current facility-administered medications for this visit.       Social History     Tobacco Use   • Smoking status: Never Smoker   • Smokeless tobacco: Never Used   Substance Use Topics   • Alcohol use: No   • Drug use: Yes     Types: Methamphetamines       Family Status   Relation Name Status   • Mo  Alive   • Fa  Alive   No family history on file.    ROS:  Review of Systems   Constitutional: Negative for fever, chills, weight loss and malaise/fatigue.   HENT: Negative for ear pain, nosebleeds, congestion, sore throat and neck pain.    Eyes: Negative for blurred vision.   Respiratory: Negative for cough, sputum production, shortness of breath and wheezing.    Cardiovascular: Negative for chest pain, palpitations, orthopnea and leg swelling.   Gastrointestinal: Negative for heartburn,  "nausea, vomiting and abdominal pain.   Genitourinary: Negative for dysuria, urgency and frequency.   Musculoskeletal: Negative for myalgias, back pain and joint pain.   Skin: Negative for rash and itching.   Neurological: Negative for dizziness, tingling, tremors, sensory change, focal weakness and headaches.   Endo/Heme/Allergies: Does not bruise/bleed easily.   Psychiatric/Behavioral: Negative for depression, suicidal ideas and memory loss.  The patient is not nervous/anxious and does have insomnia.    All other systems reviewed and are negative except as in HPI.    Exam: /60 (BP Location: Left arm, Patient Position: Sitting, BP Cuff Size: Adult)   Pulse 91   Temp 36.7 °C (98 °F) (Temporal)   Resp 18   Ht 1.727 m (5' 8\")   Wt 78 kg (172 lb)   SpO2 98%  Body mass index is 26.15 kg/m².  General: Normal appearing. No distress.  HEENT: Normocephalic. Eyes conjunctiva clear lids without ptosis, pupils equal and reactive to light accommodation,canals are clear bilaterally.   Neck: Supple without JVD. Thyroid is not enlarged.  Pulmonary: Clear to ausculation.  Normal effort. No rales, ronchi, or wheezing.  Cardiovascular: Regular rate and rhythm without murmur.  Abdomen: Nondistended  Neurologic: Grossly nonfocal.  Cranial nerves are normal.   Skin: Warm and dry.  No rashes or suspicious skin lesions.  Musculoskeletal: Normal gait. No extremity cyanosis, clubbing, or edema.  Psych: Normal mood and affect. Alert and oriented x3. Judgment and insight is normal.    Medical decision-making and discussion:  1. Other insomnia  Chronic problem.  Uncontrolled.  Patient has tried trazodone and amitriptyline.  Trazodone caused galactorrhea and amitriptyline caused vision changes.  Medications were discontinued due to unpleasant side effects.  During today's appointment patient has been prescribed Belsomra 5 mg tab advised take 1 tab by mouth once nightly.  Discussed, side effects and adverse reactions of medication " with patient.  Patient was given a copy of prescription to give to her .  Advised patient to continue working on diet, exercise, sleep hygiene, hydration, and developing healthy coping mechanisms for stress anxiety.  Continue following up with therapist.   Continue sobriety.    Patient will follow up in 2 weeks for reevaluation.    - Suvorexant 5 MG Tab; Take 1 tablet by mouth at bedtime for 30 days.  Dispense: 30 tablet; Refill: 0    2. Birth control counseling  3. Menorrhagia with regular cycle   Patient expresses during today's appointment since discontinuing methamphetamine use on 2/9/2021 her menses have become heavier and longer.  She also tells me that menstrual cramping can be severe.  Discussed birth control options with patient during today's appointment.   Dr. Covarrubias will be seeing patient on 4/5/2021 discuss IUD placement in more detail.   Patient is past due for Pap smear.  When Dr. Wang does IUD string checks Pap smear will be completed that time.        Please note that this dictation was created using voice recognition software. I have made every reasonable attempt to correct obvious errors, but I expect that there are errors of grammar and possibly content that I did not discover before finalizing the note.    Assessment/Plan:  1. Other insomnia  Suvorexant 5 MG Tab   2. Birth control counseling     3. Menorrhagia with regular cycle         Return in about 2 weeks (around 4/16/2021).

## 2021-04-07 ENCOUNTER — TELEPHONE (OUTPATIENT)
Dept: MEDICAL GROUP | Facility: PHYSICIAN GROUP | Age: 31
End: 2021-04-07

## 2021-04-07 DIAGNOSIS — Z30.09 ENCOUNTER FOR COUNSELING REGARDING CONTRACEPTION: ICD-10-CM

## 2021-04-07 NOTE — TELEPHONE ENCOUNTER
Received a fax from [facility] stating Kyleena is not covered.      Phone Number Called: [facility] [phone]    Call outcome: I spoke with Osmel @ [facility] and confirmed Kyleena is not a covered product under patient's insurance.  They only cover buy & bill.     Notice from pharmacy scanned to media.

## 2021-04-07 NOTE — PROGRESS NOTES
We received notification that her Kyleena is not covered.  We contacted her insurance all IUDs are only covered under buy and bill which we do not do.  Gynecology referral placed.

## 2021-04-14 DIAGNOSIS — G47.09 OTHER INSOMNIA: ICD-10-CM

## 2021-04-14 RX ORDER — ESZOPICLONE 2 MG/1
2 TABLET, FILM COATED ORAL
Qty: 30 TABLET | Refills: 0 | Status: SHIPPED | OUTPATIENT
Start: 2021-04-14 | End: 2021-04-23

## 2021-04-23 ENCOUNTER — OFFICE VISIT (OUTPATIENT)
Dept: MEDICAL GROUP | Facility: PHYSICIAN GROUP | Age: 31
End: 2021-04-23
Payer: COMMERCIAL

## 2021-04-23 VITALS
BODY MASS INDEX: 25.82 KG/M2 | HEART RATE: 84 BPM | RESPIRATION RATE: 16 BRPM | SYSTOLIC BLOOD PRESSURE: 98 MMHG | TEMPERATURE: 98.2 F | DIASTOLIC BLOOD PRESSURE: 64 MMHG | HEIGHT: 68 IN | OXYGEN SATURATION: 96 % | WEIGHT: 170.4 LBS

## 2021-04-23 DIAGNOSIS — F34.0 CYCLOTHYMIC DISORDER: ICD-10-CM

## 2021-04-23 DIAGNOSIS — G47.09 OTHER INSOMNIA: ICD-10-CM

## 2021-04-23 PROCEDURE — 99214 OFFICE O/P EST MOD 30 MIN: CPT | Performed by: PHYSICIAN ASSISTANT

## 2021-04-23 RX ORDER — ZOLPIDEM TARTRATE 5 MG/1
5 TABLET ORAL NIGHTLY PRN
Qty: 30 TABLET | Refills: 0 | Status: SHIPPED | OUTPATIENT
Start: 2021-04-23 | End: 2021-05-23

## 2021-04-27 NOTE — PROGRESS NOTES
Chief Complaint   Patient presents with   • Insomnia     No change - Lunesta not effective    • Headache     2 days & 1 headache last week lasted several days        HISTORY OF PRESENT ILLNESS: Dee Vance is an established 30 y.o. female here to discuss the evaluation and management of:      Patient is a pleasant 30-year-old female here today follow-up on insomnia.    Patient stopped methamphetamine use on February 9, 2021.  Patient has suffered from insomnia for several years.  Patient was prescribed trazodone but experienced unpleasant side effects of galactorrhea and some medication was discontinued.  We tried amitriptyline for insomnia symptoms and patient started experiencing dark spots in her vision and this exacerbated anxiety so this was discontinued.  During her last appointment on 4/5/2021 patient was prescribed Lunesta 2 mg tab once nightly.  She tells me that she been taking medication for 1 week and sleep deprivation symptoms have not improved.  On average is sleeping maybe 4 hours per night.  States she does not have difficulty falling asleep but has difficulty staying asleep.  Prescribed Belsomra before lunesta but medical insurance would not cover cost of medication until patient had completed step therapy.  Patient is currently following up with a psychologist.  Patient is requesting to see a different psychologist and has agreed to follow-up with psychiatry.  She tells me that her psychologist diagnosed her with cyclothymic disorder.  Patient denies homicidal or suicidal ideation.        Patient Active Problem List    Diagnosis Date Noted   • Menorrhagia with regular cycle 04/05/2021   • Encounter for counseling regarding contraception 04/05/2021   • Methamphetamine addiction (HCC) 02/23/2021   • Other insomnia 02/23/2021   • Anxiety 02/23/2021       Allergies:Patient has no known allergies.    Current Outpatient Medications   Medication Sig Dispense Refill   • zolpidem (AMBIEN) 5 MG Tab  "Take 1 tablet by mouth at bedtime as needed for Sleep for up to 30 days. 30 tablet 0   • vitamin D, Ergocalciferol, (DRISDOL) 1.25 MG (82867 UT) Cap capsule Take 1 capsule by mouth every 7 days. 30 capsule 0   • albuterol (VENTOLIN OR PROVENTIL) 108 (90 BASE) MCG/ACT AERS inhalation aerosol Inhale 2 Puffs by mouth every 6 hours as needed for Shortness of Breath. 8.5 g 1     No current facility-administered medications for this visit.       Social History     Tobacco Use   • Smoking status: Never Smoker   • Smokeless tobacco: Never Used   Substance Use Topics   • Alcohol use: No   • Drug use: Yes     Types: Methamphetamines       Family Status   Relation Name Status   • Mo  Alive   • Fa  Alive   No family history on file.    ROS:  Review of Systems   Constitutional: Negative for fever, chills, weight loss and malaise/fatigue.   HENT: Negative for ear pain, nosebleeds, congestion, sore throat and neck pain.    Eyes: Negative for blurred vision.   Respiratory: Negative for cough, sputum production, shortness of breath and wheezing.    Cardiovascular: Negative for chest pain, palpitations, orthopnea and leg swelling.   Gastrointestinal: Negative for heartburn, nausea, vomiting and abdominal pain.   Genitourinary: Negative for dysuria, urgency and frequency.   Musculoskeletal: Negative for myalgias, back pain and joint pain.   Skin: Negative for rash and itching.   Neurological: Negative for dizziness, tingling, tremors, sensory change, focal weakness and headaches.   Endo/Heme/Allergies: Does not bruise/bleed easily.   Psychiatric/Behavioral: Negative for  suicidal ideas and memory loss.  The patient is nervous/anxious and does have insomnia. + for depression.   All other systems reviewed and are negative except as in HPI.    Exam: BP (!) 98/64 (BP Location: Left arm, Patient Position: Sitting, BP Cuff Size: Adult)   Pulse 84   Temp 36.8 °C (98.2 °F) (Temporal)   Resp 16   Ht 1.727 m (5' 8\")   Wt 77.3 kg (170 lb " 6.4 oz)   SpO2 96%  Body mass index is 25.91 kg/m².  General: Normal appearing. No distress.  HEENT: Normocephalic. Eyes conjunctiva clear lids without ptosis, ears normal shape and contour.  Neck: Supple without JVD. Thyroid is not enlarged.  Pulmonary: Clear to ausculation.  Normal effort. No rales, ronchi, or wheezing.  Cardiovascular: Regular rate and rhythm without murmur.  Abdomen: Nondistended.   Neurologic: Grossly nonfocal.  Cranial nerves are normal.   Skin: Warm and dry.  No rashes or suspicious skin lesions.  Musculoskeletal: Normal gait. No extremity cyanosis, clubbing, or edema.  Psych: Normal mood and affect. Alert and oriented x3. Judgment and insight is normal.    Medical decision-making and discussion:  1. Other insomnia  Patient has tried trazodone, amitriptyline, and Lunesta with no improvement of sleep deprivation symptoms.  With trazodone patient experienced galactorrhea.  Amitriptyline caused abnormal vision changes and exacerbation anxiety symptoms.  Lunesta did not help with sleep maintenance.  Medical insurance will not cover the cost of Belsomra until patient has completed step therapy.  Patient does live alone and is concerned about starting prescription sleep medications.  Patient has agreed to try Ambien.  Advised patient when initiating medication to take half a tablet by mouth once nightly 30 to 60 minutes prior to bedtime and if sleep deprivation symptoms do not improve on this regimen to increase to full tablet.  Advised patient when starting medication to stay at her mother's house for a few nights for surveillance.  Patient agreed to plan.  Advised patient continue working on diet, exercise, sleep hygiene, and developing healthy coping mechanisms for stress anxiety.  Patient has been referred to psychiatry for further evaluation of diagnosis of cyclothymic disorder.  Per patient request she is referred to a different psychologist.    Denies any suicidal or homicidal ideation.   Discussed that should the patient have any symptoms they should call suicide prevention hotline or report to the emergency room immediately.    Follow-up for worsening symptoms,lack of expected recovery, or should new symptoms or problems arise.        - REFERRAL TO PSYCHIATRY  - REFERRAL TO PSYCHOLOGY  - zolpidem (AMBIEN) 5 MG Tab; Take 1 tablet by mouth at bedtime as needed for Sleep for up to 30 days.  Dispense: 30 tablet; Refill: 0    2. Cyclothymic disorder  Same as # 1.    - REFERRAL TO PSYCHIATRY  - REFERRAL TO PSYCHOLOGY      Please note that this dictation was created using voice recognition software. I have made every reasonable attempt to correct obvious errors, but I expect that there are errors of grammar and possibly content that I did not discover before finalizing the note.    Assessment/Plan:  1. Other insomnia  REFERRAL TO PSYCHIATRY    REFERRAL TO PSYCHOLOGY    zolpidem (AMBIEN) 5 MG Tab   2. Cyclothymic disorder  REFERRAL TO PSYCHIATRY    REFERRAL TO PSYCHOLOGY       Return in about 2 weeks (around 5/7/2021), or if symptoms worsen or fail to improve.